# Patient Record
Sex: MALE | Race: OTHER | HISPANIC OR LATINO | URBAN - METROPOLITAN AREA
[De-identification: names, ages, dates, MRNs, and addresses within clinical notes are randomized per-mention and may not be internally consistent; named-entity substitution may affect disease eponyms.]

---

## 2022-11-01 ENCOUNTER — EMERGENCY (EMERGENCY)
Facility: HOSPITAL | Age: 27
LOS: 1 days | Discharge: HOME | End: 2022-11-01
Attending: EMERGENCY MEDICINE
Payer: SELF-PAY

## 2022-11-01 VITALS
WEIGHT: 130.07 LBS | OXYGEN SATURATION: 100 % | TEMPERATURE: 98 F | HEART RATE: 64 BPM | RESPIRATION RATE: 20 BRPM | HEIGHT: 66 IN | SYSTOLIC BLOOD PRESSURE: 131 MMHG | DIASTOLIC BLOOD PRESSURE: 75 MMHG

## 2022-11-01 VITALS
SYSTOLIC BLOOD PRESSURE: 106 MMHG | TEMPERATURE: 99 F | HEART RATE: 64 BPM | RESPIRATION RATE: 18 BRPM | OXYGEN SATURATION: 100 % | DIASTOLIC BLOOD PRESSURE: 64 MMHG

## 2022-11-01 LAB
ALBUMIN SERPL ELPH-MCNC: 4.8 G/DL — SIGNIFICANT CHANGE UP (ref 3.3–5)
ALP SERPL-CCNC: 70 U/L — SIGNIFICANT CHANGE UP (ref 40–120)
ALT FLD-CCNC: 13 U/L — SIGNIFICANT CHANGE UP (ref 10–45)
ANION GAP SERPL CALC-SCNC: 11 MMOL/L — SIGNIFICANT CHANGE UP (ref 5–17)
APPEARANCE UR: ABNORMAL
AST SERPL-CCNC: 18 U/L — SIGNIFICANT CHANGE UP (ref 10–40)
BACTERIA # UR AUTO: NEGATIVE — SIGNIFICANT CHANGE UP
BASOPHILS # BLD AUTO: 0.02 K/UL — SIGNIFICANT CHANGE UP (ref 0–0.2)
BASOPHILS NFR BLD AUTO: 0.1 % — SIGNIFICANT CHANGE UP (ref 0–2)
BILIRUB SERPL-MCNC: 0.3 MG/DL — SIGNIFICANT CHANGE UP (ref 0.2–1.2)
BILIRUB UR-MCNC: NEGATIVE — SIGNIFICANT CHANGE UP
BUN SERPL-MCNC: 21 MG/DL — SIGNIFICANT CHANGE UP (ref 7–23)
CALCIUM SERPL-MCNC: 9.6 MG/DL — SIGNIFICANT CHANGE UP (ref 8.4–10.5)
CHLORIDE SERPL-SCNC: 103 MMOL/L — SIGNIFICANT CHANGE UP (ref 96–108)
CO2 SERPL-SCNC: 26 MMOL/L — SIGNIFICANT CHANGE UP (ref 22–31)
COLOR SPEC: YELLOW — SIGNIFICANT CHANGE UP
CREAT SERPL-MCNC: 0.89 MG/DL — SIGNIFICANT CHANGE UP (ref 0.5–1.3)
DIFF PNL FLD: ABNORMAL
EGFR: 121 ML/MIN/1.73M2 — SIGNIFICANT CHANGE UP
EOSINOPHIL # BLD AUTO: 0 K/UL — SIGNIFICANT CHANGE UP (ref 0–0.5)
EOSINOPHIL NFR BLD AUTO: 0 % — SIGNIFICANT CHANGE UP (ref 0–6)
EPI CELLS # UR: 3 /HPF — SIGNIFICANT CHANGE UP
GLUCOSE SERPL-MCNC: 124 MG/DL — HIGH (ref 70–99)
GLUCOSE UR QL: NEGATIVE — SIGNIFICANT CHANGE UP
HCT VFR BLD CALC: 42 % — SIGNIFICANT CHANGE UP (ref 39–50)
HGB BLD-MCNC: 14.2 G/DL — SIGNIFICANT CHANGE UP (ref 13–17)
HYALINE CASTS # UR AUTO: 2 /LPF — SIGNIFICANT CHANGE UP (ref 0–2)
IMM GRANULOCYTES NFR BLD AUTO: 0.4 % — SIGNIFICANT CHANGE UP (ref 0–0.9)
KETONES UR-MCNC: NEGATIVE — SIGNIFICANT CHANGE UP
LEUKOCYTE ESTERASE UR-ACNC: NEGATIVE — SIGNIFICANT CHANGE UP
LIDOCAIN IGE QN: 14 U/L — SIGNIFICANT CHANGE UP (ref 7–60)
LYMPHOCYTES # BLD AUTO: 0.52 K/UL — LOW (ref 1–3.3)
LYMPHOCYTES # BLD AUTO: 3.5 % — LOW (ref 13–44)
MCHC RBC-ENTMCNC: 27.4 PG — SIGNIFICANT CHANGE UP (ref 27–34)
MCHC RBC-ENTMCNC: 33.8 GM/DL — SIGNIFICANT CHANGE UP (ref 32–36)
MCV RBC AUTO: 81.1 FL — SIGNIFICANT CHANGE UP (ref 80–100)
MONOCYTES # BLD AUTO: 0.52 K/UL — SIGNIFICANT CHANGE UP (ref 0–0.9)
MONOCYTES NFR BLD AUTO: 3.5 % — SIGNIFICANT CHANGE UP (ref 2–14)
NEUTROPHILS # BLD AUTO: 13.91 K/UL — HIGH (ref 1.8–7.4)
NEUTROPHILS NFR BLD AUTO: 92.5 % — HIGH (ref 43–77)
NITRITE UR-MCNC: NEGATIVE — SIGNIFICANT CHANGE UP
NRBC # BLD: 0 /100 WBCS — SIGNIFICANT CHANGE UP (ref 0–0)
PH UR: 7 — SIGNIFICANT CHANGE UP (ref 5–8)
PLATELET # BLD AUTO: 261 K/UL — SIGNIFICANT CHANGE UP (ref 150–400)
POTASSIUM SERPL-MCNC: 4.2 MMOL/L — SIGNIFICANT CHANGE UP (ref 3.5–5.3)
POTASSIUM SERPL-SCNC: 4.2 MMOL/L — SIGNIFICANT CHANGE UP (ref 3.5–5.3)
PROT SERPL-MCNC: 7.4 G/DL — SIGNIFICANT CHANGE UP (ref 6–8.3)
PROT UR-MCNC: ABNORMAL
RBC # BLD: 5.18 M/UL — SIGNIFICANT CHANGE UP (ref 4.2–5.8)
RBC # FLD: 13.2 % — SIGNIFICANT CHANGE UP (ref 10.3–14.5)
RBC CASTS # UR COMP ASSIST: 190 /HPF — HIGH (ref 0–4)
SODIUM SERPL-SCNC: 140 MMOL/L — SIGNIFICANT CHANGE UP (ref 135–145)
SP GR SPEC: 1.03 — HIGH (ref 1.01–1.02)
UROBILINOGEN FLD QL: NEGATIVE — SIGNIFICANT CHANGE UP
WBC # BLD: 15.03 K/UL — HIGH (ref 3.8–10.5)
WBC # FLD AUTO: 15.03 K/UL — HIGH (ref 3.8–10.5)
WBC UR QL: 5 /HPF — SIGNIFICANT CHANGE UP (ref 0–5)

## 2022-11-01 PROCEDURE — 80053 COMPREHEN METABOLIC PANEL: CPT

## 2022-11-01 PROCEDURE — 76870 US EXAM SCROTUM: CPT | Mod: 26

## 2022-11-01 PROCEDURE — 99285 EMERGENCY DEPT VISIT HI MDM: CPT | Mod: 25

## 2022-11-01 PROCEDURE — 93975 VASCULAR STUDY: CPT | Mod: 26

## 2022-11-01 PROCEDURE — 85025 COMPLETE CBC W/AUTO DIFF WBC: CPT

## 2022-11-01 PROCEDURE — 81001 URINALYSIS AUTO W/SCOPE: CPT

## 2022-11-01 PROCEDURE — 76870 US EXAM SCROTUM: CPT

## 2022-11-01 PROCEDURE — 87086 URINE CULTURE/COLONY COUNT: CPT

## 2022-11-01 PROCEDURE — 83690 ASSAY OF LIPASE: CPT

## 2022-11-01 PROCEDURE — 93975 VASCULAR STUDY: CPT

## 2022-11-01 PROCEDURE — 99284 EMERGENCY DEPT VISIT MOD MDM: CPT

## 2022-11-01 RX ORDER — ACETAMINOPHEN 500 MG
650 TABLET ORAL ONCE
Refills: 0 | Status: COMPLETED | OUTPATIENT
Start: 2022-11-01 | End: 2022-11-01

## 2022-11-01 RX ORDER — IBUPROFEN 200 MG
600 TABLET ORAL ONCE
Refills: 0 | Status: COMPLETED | OUTPATIENT
Start: 2022-11-01 | End: 2022-11-01

## 2022-11-01 RX ORDER — ONDANSETRON 8 MG/1
4 TABLET, FILM COATED ORAL ONCE
Refills: 0 | Status: COMPLETED | OUTPATIENT
Start: 2022-11-01 | End: 2022-11-01

## 2022-11-01 RX ADMIN — Medication 600 MILLIGRAM(S): at 13:42

## 2022-11-01 RX ADMIN — Medication 650 MILLIGRAM(S): at 13:43

## 2022-11-01 RX ADMIN — ONDANSETRON 4 MILLIGRAM(S): 8 TABLET, FILM COATED ORAL at 15:52

## 2022-11-01 NOTE — ED ADULT NURSE NOTE - OBJECTIVE STATEMENT
Male 27 years old with no past medical history came in with sudden onset of lower abdominal pain radiating to right testicle this morning. Vomited once. Denies fever, chills, urinary symptoms, diarrhea or constipation. Denies injury to testicle or penile drainage, redness or swelling. Abdomen soft and non tender. Will monitor.

## 2022-11-01 NOTE — ED PROVIDER NOTE - OBJECTIVE STATEMENT
int id [id number]  27-year-old male no past medical history chief complaint of right-sided testicular pain and abdominal pain right lower quadrant.  Patient no symptoms started this morning woke up with 5 episodes of emesis and right-sided testicular pain.  Denies any discharge dysuria from his penis.  Never had the symptoms before did not take any pain medication denies any systemic symptoms such as fever chest pain shortness of breath.

## 2022-11-01 NOTE — ED PROVIDER NOTE - PHYSICAL EXAMINATION
GENERAL: Awake, alert, NAD  LUNGS: CTAB, no wheezes or crackles   CARDIAC: RRR, no m/r/g  ABDOMEN: Soft, non tender, non distended, no rebound, no guarding  BACK: No midline spinal tenderness, no CVA tenderness  Genitals; Chaperone colby RN Cremasteric reflex present bilaterally Nontender testicles  NEURO: A&Ox3. Moving all extremities.  SKIN: Warm and dry. No rash.  PSYCH: Normal affect.

## 2022-11-01 NOTE — ED PROVIDER NOTE - PROGRESS NOTE DETAILS
Patient was in the waiting room for approximately 2 hours before arrival to the main ED.  Ultrasound was called urgently after leaving the room to expedite ultrasound to rule out testicular torsion. Annamaria PGY 2 pt has no abd pain or scrotal pain since being in ED Annamaria PGY 2 remains w/o pain ua showed blood probably stone

## 2022-11-01 NOTE — ED PROVIDER NOTE - NSFOLLOWUPINSTRUCTIONS_ED_ALL_ED_FT
Today you were seen in the ED for Abdominal pain and scrotal pain    It was found that you have.  No signs of testicular torsion Under ultrasound exam.    You can take Tylenol and Motrin every 8 hours for pain as needed.     Please follow up with Your urologist and gastroenterologist if you not have any information for such can be found below.    Please return to the ED if you have any of the following: You develop again scrotal pain nausea vomiting abdominal pain.  As these can be signs of medical emergency that are evolving. Please return additionally if you have burning when you pee or blood in your urine. Today you were seen in the ED for Abdominal pain and scrotal pain    It was found that you have.  No signs of testicular torsion Under ultrasound exam.    You can take Tylenol and Motrin every 8 hours for pain as needed.     Please follow up with Your urologist if you not have any information for such can be found below.    YOU NEED TO RETURN TO THE ED IF YOU HAVE TESTICULAR PAIN RETURN.     Please return to the ED if you have any of the following: You develop again scrotal pain nausea vomiting abdominal pain.  As these can be signs of medical emergency that are evolving. Please return additionally if you have burning when you pee or blood in your urine.

## 2022-11-01 NOTE — ED PROVIDER NOTE - PATIENT PORTAL LINK FT
You can access the FollowMyHealth Patient Portal offered by [facility] by registering at the following website: [email or web address]. By joining [employer]’s FollowMyHealth portal, you will also be able to view your health information using other applications (apps) compatible with our system.

## 2022-11-01 NOTE — ED PROVIDER NOTE - CLINICAL SUMMARY MEDICAL DECISION MAKING FREE TEXT BOX
28-year-old male no segment past medical history chief complaint of right lower quadrant abdominal pain and testicular pain.  Given history and physical concerning for possible UTI versus renal colic versus testicular torsion.  Will assess with labs and imaging.

## 2022-11-01 NOTE — ED ADULT NURSE NOTE - BREATHING, MLM
You can access the [facility] Patient Portal, offered by [facility], by registering with the following website: [email or web address] Spontaneous, unlabored and symmetrical

## 2022-11-01 NOTE — ED PROVIDER NOTE - NSFOLLOWUPCLINICS_GEN_ALL_ED_FT
[facility] - Urology  Urology  [address], [address]th [place] [address]  Phone: [phone]  Fax:   Follow Up Time: 4-6 Days

## 2022-11-01 NOTE — ED ADULT NURSE REASSESSMENT NOTE - NS ED NURSE REASSESS COMMENT FT1
Pt A+Ox3, VSS, US and lab results unremarkable. Pt cleared for d/c home for follow up with urologist.

## 2022-11-02 LAB
CULTURE RESULTS: SIGNIFICANT CHANGE UP
SPECIMEN SOURCE: SIGNIFICANT CHANGE UP